# Patient Record
Sex: FEMALE | Race: WHITE
[De-identification: names, ages, dates, MRNs, and addresses within clinical notes are randomized per-mention and may not be internally consistent; named-entity substitution may affect disease eponyms.]

---

## 2019-12-26 ENCOUNTER — HOSPITAL ENCOUNTER (INPATIENT)
Dept: HOSPITAL 63 - 1 SOUTH | Age: 24
LOS: 2 days | Discharge: HOME | DRG: 195 | End: 2019-12-28
Attending: FAMILY MEDICINE | Admitting: FAMILY MEDICINE
Payer: COMMERCIAL

## 2019-12-26 VITALS — DIASTOLIC BLOOD PRESSURE: 74 MMHG | SYSTOLIC BLOOD PRESSURE: 117 MMHG

## 2019-12-26 VITALS — WEIGHT: 145.25 LBS | BODY MASS INDEX: 27.42 KG/M2 | HEIGHT: 61 IN

## 2019-12-26 VITALS — SYSTOLIC BLOOD PRESSURE: 134 MMHG | DIASTOLIC BLOOD PRESSURE: 94 MMHG

## 2019-12-26 VITALS — SYSTOLIC BLOOD PRESSURE: 136 MMHG | DIASTOLIC BLOOD PRESSURE: 88 MMHG

## 2019-12-26 VITALS — SYSTOLIC BLOOD PRESSURE: 144 MMHG | DIASTOLIC BLOOD PRESSURE: 97 MMHG

## 2019-12-26 DIAGNOSIS — B96.0: ICD-10-CM

## 2019-12-26 DIAGNOSIS — J18.9: Primary | ICD-10-CM

## 2019-12-26 DIAGNOSIS — J98.01: ICD-10-CM

## 2019-12-26 LAB
ALBUMIN SERPL-MCNC: 3 G/DL (ref 3.4–5)
ALBUMIN/GLOB SERPL: 0.6 {RATIO} (ref 1–1.7)
ALP SERPL-CCNC: 81 U/L (ref 46–116)
ALT SERPL-CCNC: 47 U/L (ref 14–59)
ANION GAP SERPL CALC-SCNC: 10 MMOL/L (ref 6–14)
APTT PPP: (no result) S
AST SERPL-CCNC: 41 U/L (ref 15–37)
BACTERIA #/AREA URNS HPF: 0 /HPF
BASOPHILS # BLD AUTO: 0 X10^3/UL (ref 0–0.2)
BASOPHILS NFR BLD: 1 % (ref 0–3)
BILIRUB SERPL-MCNC: 0.2 MG/DL (ref 0.2–1)
BILIRUB UR QL STRIP: (no result)
BUN/CREAT SERPL: 5 (ref 6–20)
CA-I SERPL ISE-MCNC: 4 MG/DL (ref 7–20)
CALCIUM SERPL-MCNC: 8.6 MG/DL (ref 8.5–10.1)
CHLORIDE SERPL-SCNC: 101 MMOL/L (ref 98–107)
CO2 SERPL-SCNC: 26 MMOL/L (ref 21–32)
CREAT SERPL-MCNC: 0.8 MG/DL (ref 0.6–1)
EOSINOPHIL NFR BLD: 0.1 X10^3/UL (ref 0–0.7)
EOSINOPHIL NFR BLD: 1 % (ref 0–3)
ERYTHROCYTE [DISTWIDTH] IN BLOOD BY AUTOMATED COUNT: 13.3 % (ref 11.5–14.5)
FIBRINOGEN PPP-MCNC: CLEAR MG/DL
GFR SERPLBLD BASED ON 1.73 SQ M-ARVRAT: 89.7 ML/MIN
GLOBULIN SER-MCNC: 4.9 G/DL (ref 2.2–3.8)
GLUCOSE SERPL-MCNC: 103 MG/DL (ref 70–99)
GLUCOSE UR STRIP-MCNC: (no result) MG/DL
HCT VFR BLD CALC: 34.6 % (ref 36–47)
HGB BLD-MCNC: 11.4 G/DL (ref 12–15.5)
INFLUENZA A PATIENT: NEGATIVE
INFLUENZA B PATIENT: NEGATIVE
LYMPHOCYTES # BLD: 0.8 X10^3/UL (ref 1–4.8)
LYMPHOCYTES NFR BLD AUTO: 11 % (ref 24–48)
MCH RBC QN AUTO: 26 PG (ref 25–35)
MCHC RBC AUTO-ENTMCNC: 33 G/DL (ref 31–37)
MCV RBC AUTO: 80 FL (ref 79–100)
MONO #: 0.8 X10^3/UL (ref 0–1.1)
MONOCYTES NFR BLD: 10 % (ref 0–9)
NEUT #: 5.8 X10^3UL (ref 1.8–7.7)
NEUTROPHILS NFR BLD AUTO: 77 % (ref 31–73)
NITRITE UR QL STRIP: (no result)
PLATELET # BLD AUTO: 350 X10^3/UL (ref 140–400)
POTASSIUM SERPL-SCNC: 3.8 MMOL/L (ref 3.5–5.1)
PROT SERPL-MCNC: 7.9 G/DL (ref 6.4–8.2)
RBC # BLD AUTO: 4.34 X10^6/UL (ref 3.5–5.4)
RBC #/AREA URNS HPF: 0 /HPF (ref 0–2)
SODIUM SERPL-SCNC: 137 MMOL/L (ref 136–145)
SP GR UR STRIP: 1.01
SQUAMOUS #/AREA URNS LPF: (no result) /LPF
U PREG PATIENT: NEGATIVE
UROBILINOGEN UR-MCNC: 0.2 MG/DL
WBC # BLD AUTO: 7.5 X10^3/UL (ref 4–11)
WBC #/AREA URNS HPF: (no result) /HPF (ref 0–4)

## 2019-12-26 PROCEDURE — 71046 X-RAY EXAM CHEST 2 VIEWS: CPT

## 2019-12-26 PROCEDURE — 81025 URINE PREGNANCY TEST: CPT

## 2019-12-26 PROCEDURE — 94640 AIRWAY INHALATION TREATMENT: CPT

## 2019-12-26 PROCEDURE — 87040 BLOOD CULTURE FOR BACTERIA: CPT

## 2019-12-26 PROCEDURE — 85007 BL SMEAR W/DIFF WBC COUNT: CPT

## 2019-12-26 PROCEDURE — 36415 COLL VENOUS BLD VENIPUNCTURE: CPT

## 2019-12-26 PROCEDURE — 85379 FIBRIN DEGRADATION QUANT: CPT

## 2019-12-26 PROCEDURE — 87804 INFLUENZA ASSAY W/OPTIC: CPT

## 2019-12-26 PROCEDURE — 83605 ASSAY OF LACTIC ACID: CPT

## 2019-12-26 PROCEDURE — 85025 COMPLETE CBC W/AUTO DIFF WBC: CPT

## 2019-12-26 PROCEDURE — 81001 URINALYSIS AUTO W/SCOPE: CPT

## 2019-12-26 PROCEDURE — 71275 CT ANGIOGRAPHY CHEST: CPT

## 2019-12-26 PROCEDURE — 80053 COMPREHEN METABOLIC PANEL: CPT

## 2019-12-26 RX ADMIN — Medication SCH CAP: at 19:28

## 2019-12-26 RX ADMIN — IPRATROPIUM BROMIDE AND ALBUTEROL SULFATE SCH ML: .5; 3 SOLUTION RESPIRATORY (INHALATION) at 21:48

## 2019-12-26 RX ADMIN — ACETAMINOPHEN PRN MG: 500 TABLET ORAL at 19:28

## 2019-12-26 RX ADMIN — GUAIFENESIN AND DEXTROMETHORPHAN PRN ML: 100; 10 SYRUP ORAL at 19:28

## 2019-12-26 NOTE — RAD
EXAM: Chest, 2 views.

 

HISTORY: Pneumonia.

 

COMPARISON: None.

 

FINDINGS: 2 views of the chest are obtained. There is partially 

consolidated right upper lobe pneumonia. There is no pleural effusion or 

pneumothorax. The heart is normal in size. There is gaseous distention of 

the stomach.

 

IMPRESSION: Partially consolidated right upper lobe pneumonia. Follow-up 

to confirm resolution.

 

Electronically signed by: Natalia Coughlin MD (12/26/2019 2:37 PM) [place] [id number]

## 2019-12-27 VITALS — SYSTOLIC BLOOD PRESSURE: 121 MMHG | DIASTOLIC BLOOD PRESSURE: 82 MMHG

## 2019-12-27 VITALS — SYSTOLIC BLOOD PRESSURE: 126 MMHG | DIASTOLIC BLOOD PRESSURE: 86 MMHG

## 2019-12-27 VITALS — DIASTOLIC BLOOD PRESSURE: 81 MMHG | SYSTOLIC BLOOD PRESSURE: 116 MMHG

## 2019-12-27 VITALS — DIASTOLIC BLOOD PRESSURE: 87 MMHG | SYSTOLIC BLOOD PRESSURE: 124 MMHG

## 2019-12-27 RX ADMIN — SODIUM CHLORIDE SCH MLS/HR: 0.9 INJECTION, SOLUTION INTRAVENOUS at 17:46

## 2019-12-27 RX ADMIN — IPRATROPIUM BROMIDE AND ALBUTEROL SULFATE SCH ML: .5; 3 SOLUTION RESPIRATORY (INHALATION) at 15:41

## 2019-12-27 RX ADMIN — SODIUM CHLORIDE SCH MLS/HR: 0.9 INJECTION, SOLUTION INTRAVENOUS at 08:32

## 2019-12-27 RX ADMIN — GUAIFENESIN AND DEXTROMETHORPHAN PRN ML: 100; 10 SYRUP ORAL at 00:26

## 2019-12-27 RX ADMIN — ACETAMINOPHEN PRN MG: 500 TABLET ORAL at 20:32

## 2019-12-27 RX ADMIN — Medication SCH CAP: at 08:32

## 2019-12-27 RX ADMIN — Medication SCH CAP: at 20:33

## 2019-12-27 RX ADMIN — ACETAMINOPHEN PRN MG: 500 TABLET ORAL at 12:38

## 2019-12-27 RX ADMIN — SODIUM CHLORIDE SCH MLS/HR: 0.9 INJECTION, SOLUTION INTRAVENOUS at 20:50

## 2019-12-27 RX ADMIN — IPRATROPIUM BROMIDE AND ALBUTEROL SULFATE SCH ML: .5; 3 SOLUTION RESPIRATORY (INHALATION) at 09:54

## 2019-12-27 RX ADMIN — GUAIFENESIN AND DEXTROMETHORPHAN PRN ML: 100; 10 SYRUP ORAL at 23:54

## 2019-12-27 RX ADMIN — IPRATROPIUM BROMIDE AND ALBUTEROL SULFATE SCH ML: .5; 3 SOLUTION RESPIRATORY (INHALATION) at 05:28

## 2019-12-27 RX ADMIN — ACETAMINOPHEN PRN MG: 500 TABLET ORAL at 02:57

## 2019-12-27 NOTE — RAD
Exam: CT of chest with contrast

 

INDICATION: Elevated d-dimer, pneumonia

 

TECHNIQUE: Sequential axial images through the chest obtained following 

the administration of 90 mL of Omni 350 IV contrast. Sagittal and coronal 

reformatted images were reconstructed from the axial data and reviewed. 

3-D reformatted images were reconstructed from the axial data and 

reviewed.

 

Comparisons: Chest x-ray same day

 

FINDINGS:

Visualized portions of the thyroid are unremarkable. No enlarged 

mediastinal lymph nodes are identified.

 

Heart size is normal. No pericardial effusion. Thoracic aorta has a normal

course and caliber. Pulmonary artery is not enlarged. No pulmonary embolus

identified within the main, lobar or segmental pulmonary arteries.

 

Airways are patent. There is consolidative changes in the right upper 

lobe. Patchy tree-in-bud nodularity in the superior segment of the right 

lower lobe. Remaining lungs are clear.

 

No pleural effusion or thickening.

 

Visualized upper abdomen is unremarkable.

 

No suspicious osseous lesions or acute fractures.

 

IMPRESSION:

1.  No pulmonary embolus identified within the main, lobar or segmental 

pulmonary arteries.

2.  Consolidation in the right upper lobe with tree-in-bud nodularity in 

the left upper lobe favored to be infectious in etiology. Radiographic 

follow-up posttreatment to ensure resolution is recommended.

 

 

Exposure: One or more of the following in the visualized dose reduction 

techniques were utilized for this examination:

1.  Automated exposure control

2.  Adjustment of the MA and/or KV according to patient size

3.  Use of iterative of reconstructive technique

 

Electronically signed by: Zahraa Ramos MD (12/27/2019 1:18 AM) [facility]

## 2019-12-27 NOTE — PN
DATE:  



SUBJECTIVE:  The patient was admitted yesterday with right upper lobe pneumonia,

probable atypical pneumonia.  A 24-year-old female, making good progress, feels

somewhat better today.



OBJECTIVE:

VITAL SIGNS:  Temperature has come down from 100.5 with pulse of 120, down to

98.2 with pulse 100, blood pressure 116/81, respiratory rate 18, oxygen

saturation good.

GENERAL:  The patient is alert, certainly more stable.  The patient is alert and

oriented.

LUNGS:  Diminished, primarily in the right upper lobe.

CARDIOVASCULAR:  Regular sinus rhythm, tachycardic.

ABDOMEN:  Soft, nontender.

EXTREMITIES:  No rashes or joint pain.  The patient is ambulatory.



The patient otherwise is resting fairly comfortably, receiving breathing

treatments as well as that of IV Levaquin and Rocephin and continues to be

monitored on that.



IMPRESSION:  Atypical pneumonia, right upper lobe mass.



PLAN:  Continue with IV antibiotic therapy and make further evaluation on her as

indicated.





______________________________

RED GRIFFITHS MD



DR:  DNEAE/trent  JOB#:  [id number] / [id number]

DD:  12/27/2019 09:45  DT:  12/27/2019 11:12

## 2019-12-28 VITALS — DIASTOLIC BLOOD PRESSURE: 83 MMHG | SYSTOLIC BLOOD PRESSURE: 123 MMHG

## 2019-12-28 VITALS — DIASTOLIC BLOOD PRESSURE: 93 MMHG | SYSTOLIC BLOOD PRESSURE: 136 MMHG

## 2019-12-28 VITALS — DIASTOLIC BLOOD PRESSURE: 84 MMHG | SYSTOLIC BLOOD PRESSURE: 130 MMHG

## 2019-12-28 VITALS — DIASTOLIC BLOOD PRESSURE: 85 MMHG | SYSTOLIC BLOOD PRESSURE: 125 MMHG

## 2019-12-28 LAB
% BANDS: 2 % (ref 0–9)
% LYMPHS: 8 % (ref 24–48)
% MONOS: 8 % (ref 0–10)
% SEGS: 80 % (ref 35–66)
ALBUMIN SERPL-MCNC: 2.7 G/DL (ref 3.4–5)
ALBUMIN/GLOB SERPL: 0.6 {RATIO} (ref 1–1.7)
ALP SERPL-CCNC: 65 U/L (ref 46–116)
ALT SERPL-CCNC: 36 U/L (ref 14–59)
ANION GAP SERPL CALC-SCNC: 10 MMOL/L (ref 6–14)
AST SERPL-CCNC: 23 U/L (ref 15–37)
BASOPHILS # BLD AUTO: 0 X10^3/UL (ref 0–0.2)
BASOPHILS NFR BLD: 0 % (ref 0–3)
BILIRUB SERPL-MCNC: 0.2 MG/DL (ref 0.2–1)
BUN/CREAT SERPL: 7 (ref 6–20)
CA-I SERPL ISE-MCNC: 4 MG/DL (ref 7–20)
CALCIUM SERPL-MCNC: 8.8 MG/DL (ref 8.5–10.1)
CHLORIDE SERPL-SCNC: 101 MMOL/L (ref 98–107)
CO2 SERPL-SCNC: 24 MMOL/L (ref 21–32)
CREAT SERPL-MCNC: 0.6 MG/DL (ref 0.6–1)
EOSINOPHIL NFR BLD AUTO: 2 % (ref 0–5)
EOSINOPHIL NFR BLD: 0.2 X10^3/UL (ref 0–0.7)
EOSINOPHIL NFR BLD: 2 % (ref 0–3)
ERYTHROCYTE [DISTWIDTH] IN BLOOD BY AUTOMATED COUNT: 13.2 % (ref 11.5–14.5)
GFR SERPLBLD BASED ON 1.73 SQ M-ARVRAT: 125 ML/MIN
GLOBULIN SER-MCNC: 4.4 G/DL (ref 2.2–3.8)
GLUCOSE SERPL-MCNC: 90 MG/DL (ref 70–99)
HCT VFR BLD CALC: 34.1 % (ref 36–47)
HGB BLD-MCNC: 11.3 G/DL (ref 12–15.5)
LYMPHOCYTES # BLD: 1.2 X10^3/UL (ref 1–4.8)
LYMPHOCYTES NFR BLD AUTO: 11 % (ref 24–48)
MCH RBC QN AUTO: 26 PG (ref 25–35)
MCHC RBC AUTO-ENTMCNC: 33 G/DL (ref 31–37)
MCV RBC AUTO: 78 FL (ref 79–100)
MICROCYTES BLD QL SMEAR: SLIGHT
MONO #: 0.6 X10^3/UL (ref 0–1.1)
MONOCYTES NFR BLD: 5 % (ref 0–9)
NEUT #: 9.6 X10^3UL (ref 1.8–7.7)
NEUTROPHILS NFR BLD AUTO: 82 % (ref 31–73)
PLATELET # BLD AUTO: 406 X10^3/UL (ref 140–400)
PLATELET # BLD EST: (no result) 10*3/UL
POTASSIUM SERPL-SCNC: 3.6 MMOL/L (ref 3.5–5.1)
PROT SERPL-MCNC: 7.1 G/DL (ref 6.4–8.2)
RBC # BLD AUTO: 4.35 X10^6/UL (ref 3.5–5.4)
SODIUM SERPL-SCNC: 135 MMOL/L (ref 136–145)
TOXIC GRANULES BLD QL SMEAR: SLIGHT
WBC # BLD AUTO: 11.7 X10^3/UL (ref 4–11)

## 2019-12-28 RX ADMIN — SODIUM CHLORIDE SCH MLS/HR: 0.9 INJECTION, SOLUTION INTRAVENOUS at 10:10

## 2019-12-28 RX ADMIN — IPRATROPIUM BROMIDE AND ALBUTEROL SULFATE SCH ML: .5; 3 SOLUTION RESPIRATORY (INHALATION) at 11:10

## 2019-12-28 RX ADMIN — ACETAMINOPHEN PRN MG: 500 TABLET ORAL at 14:23

## 2019-12-28 RX ADMIN — ACETAMINOPHEN PRN MG: 500 TABLET ORAL at 05:14

## 2019-12-28 RX ADMIN — IPRATROPIUM BROMIDE AND ALBUTEROL SULFATE SCH ML: .5; 3 SOLUTION RESPIRATORY (INHALATION) at 05:33

## 2019-12-28 RX ADMIN — Medication SCH CAP: at 08:08

## 2019-12-28 RX ADMIN — SODIUM CHLORIDE SCH MLS/HR: 0.9 INJECTION, SOLUTION INTRAVENOUS at 03:26

## 2019-12-28 RX ADMIN — SODIUM CHLORIDE SCH MLS/HR: 0.9 INJECTION, SOLUTION INTRAVENOUS at 16:50

## 2019-12-28 NOTE — RAD
Chest, PA and Lateral:

 

Technique:  PA and lateral views of the chest were obtained.

 

History:  Pneumonia.

 

Comparison: 12/26/2019.

 

Findings:

 

The heart size is grossly unremarkable. Moderate consolidation identified 

in the right upper lobe of the lung identified likely pneumonia.

 

IMPRESSION:

 

Moderate consolidation right upper lobe of the lung likely pneumonia again

identified. Follow-up to resolution.

 

Electronically signed by: Akira Hauser MD (12/28/2019 11:04 AM) [facility]

## 2019-12-28 NOTE — DS
DATE OF DISCHARGE:  



HOSPITAL COURSE:  She is a pleasant 24-year-old female came in with a

nonproductive cough for a couple of weeks.  It was found on x-ray to have a

large right upper lobe consolidation and PCR demonstrated the patient to have a

mycoplasma infection, tried her on Zithromax.  She had an intolerance to the

Zithromax as well as the doxycycline, switched over to Levaquin and Rocephin and

seems to have made a good progress with that.  She was breathing much better at

the time of discharge.  She was feeling much better.  She does have a nebulizer

at home.  She was placed on some DuoNeb and Levaquin by mouth, take an extra

week off from work and of course will need to repeat a chest x-ray in time to

make sure that it does clear up.  Last blood pressure 125/85, respiratory rate

20, pulse 98.  She has been afebrile for the last 2 days.  Eating and drinking

just feels tired and needed to get additional rest.  Gave her my phone number in

case anything should happen.



IMPRESSION:  Therefore, mycoplasma pneumoniae, bronchospasm.  The patient

returned here in the next 3-4 days for followup or sooner as needed.





______________________________

RED GRIFFITHS MD



DR:  DENAE/trent  JOB#:  [id number] / [id number]

DD:  12/28/2019 15:34  DT:  12/28/2019 17:46